# Patient Record
Sex: FEMALE | Race: OTHER
[De-identification: names, ages, dates, MRNs, and addresses within clinical notes are randomized per-mention and may not be internally consistent; named-entity substitution may affect disease eponyms.]

---

## 2020-11-20 ENCOUNTER — HOSPITAL ENCOUNTER (EMERGENCY)
Dept: HOSPITAL 41 - JD.ED | Age: 20
Discharge: HOME | End: 2020-11-20
Payer: SELF-PAY

## 2020-11-20 DIAGNOSIS — L23.9: Primary | ICD-10-CM

## 2020-11-20 NOTE — EDM.PDOC
ED HPI GENERAL MEDICAL PROBLEM





- General


Chief Complaint: Skin Complaint


Stated Complaint: ARM RASH/INFECTION


Time Seen by Provider: 11/20/20 21:01


Source of Information: Reports: Patient, RN Notes Reviewed


History Limitations: Reports: No Limitations





- History of Present Illness


INITIAL COMMENTS - FREE TEXT/NARRATIVE: 





Patient is a 20-year-old female presenting to the emergency department with 

complaints of itchy, scattered rash in various areas around her body over the 

course of the last month.  She states that the itching initially began in her 

left inner arm overlying the area of her birth control implant.  She 

occasionally gets small individual itchy areas scattered throughout her body.  

She states that she lives with her  and daughter.  She often sleeps with 

her daughter and she does not have any of these types of lesions so she does not

think they are bites.  She has not been taking any over-the-counter medications 

for treatment.





- Related Data


                                    Allergies











Allergy/AdvReac Type Severity Reaction Status Date / Time


 


No Known Allergies Allergy   Verified 11/20/20 21:02











Home Meds: 


                                    Home Meds





Etonogestrel [Nexplanon] 68 mg IDERM ASDIRECTED 11/20/20 [History]











Past Medical History





- Past Health History


Medical/Surgical History: Denies Medical/Surgical History





Social & Family History





- Tobacco Use


Tobacco Use Status *Q: Never Tobacco User


Second Hand Smoke Exposure: No





- Caffeine Use


Caffeine Use: Reports: None





- Recreational Drug Use


Recreational Drug Use: No





ED ROS GENERAL





- Review of Systems


Review Of Systems: See Below


Constitutional: Reports: No Symptoms.  Denies: Fever, Chills, Weakness


HEENT: Reports: No Symptoms


Respiratory: Reports: No Symptoms


Cardiovascular: Reports: No Symptoms


Endocrine: Reports: No Symptoms


GI/Abdominal: Reports: No Symptoms


: Reports: No Symptoms


Musculoskeletal: Reports: No Symptoms


Skin: Reports: Rash


Neurological: Reports: No Symptoms


Psychiatric: Reports: No Symptoms


Hematologic/Lymphatic: Reports: No Symptoms


Immunologic: Reports: No Symptoms





ED EXAM, SKIN/RASH


Exam: See Below


General Appearance: Alert, WD/WN, No Apparent Distress


Respiratory/Chest: No Respiratory Distress, Lungs Clear, Normal Breath Sounds, 

No Accessory Muscle Use, Chest Non-Tender


Cardiovascular: Normal Peripheral Pulses, Regular Rate, Rhythm, No Edema, No 

Gallop, No JVD, No Murmur, No Rub


Skin: Rash (Rash to the medial aspect of the left upper arm.  There is an area 

of slight lichenification overlying this area.  Few, very small patches of 

redness scattered throughout the body.)





Course





- Vital Signs


Last Recorded V/S: 





                                Last Vital Signs











Temp  98.0 F   11/20/20 21:00


 


Pulse  79   11/20/20 21:00


 


Resp  17   11/20/20 21:00


 


BP  96/72   11/20/20 21:00


 


Pulse Ox  99   11/20/20 21:00














- Re-Assessments/Exams


Free Text/Narrative Re-Assessment/Exam: 


Patient is a 20-year-old female presenting to the emergency part with complaints

 of a 1 month history of itching and rash which originated to the inner aspect 

of her left upper arm overlying the area of her birth control implant.  She 

states the rash has been present consistently consistently over that area.  She 

does occasionally develop small patches of itchy rash elsewhere on her body as w

ell.  On exam, there is a slightly reddened rash area overlying the birth 

control implant on her left upper arm.  There is an area of lichenification of 

the skin suggestive that there is been chronic irritation and scratching in that

 area for an extended period of time.  She has a few very small patches of 

redness scattered on her body which have the appearance of bites, however she 

states that no one else in her household has these.  I am suspicious that she 

could be having allergic reaction to her birth control implant.  Discussed with 

her that I would recommend that she begin taking an allergy medication such as 

Allegra daily.  I would like her to contact an OB/GYN provider on Monday morning

 to discuss possibly having the implant removed to see if that alleviates her 

symptoms.  Discussed that she can also speak with them about alternative birth 

control options.  She is in agreement with this plan.  Discharge instructions as

 documented.





Departure





- Departure


Time of Disposition: 21:30


Disposition: Home, Self-Care 01


Condition: Good


Clinical Impression: 


 Allergic dermatitis








- Discharge Information


*PRESCRIPTION DRUG MONITORING PROGRAM REVIEWED*: No


*COPY OF PRESCRIPTION DRUG MONITORING REPORT IN PATIENT MAEGAN: No


Instructions:  Rash, Adult


Referrals: 


PCP,None [Primary Care Provider] - 


Additional Instructions: 


You were seen in the emergency department today for a itchy, reddened rash 

particular to the inner aspect of your right upper arm but also scattered in 

small areas throughout your body.  As we discussed, this rash has the appearance

of either an allergy or possibly bug bites.  You stated that nobody else in your

house has these lesions.  Given that the rash is more concentrated in the area 

overlying your birth control implant, it is possible that you are having an 

allergic response to the birth control device.  Recommend that she begin taking 

in over-the-counter antihistamine such as Allegra daily.  This should alleviate 

the itching.  Recommend that you contact one of the OB/GYN providers on Monday 

morning to schedule an appointment to discuss possibly having the implant re

moved and alternative birth control options.  This is the only way to tell if 

this is causing the problem.  You should experience any worsening symptoms, 

please not hesitate to return to the ER.





Sepsis Event Note (ED)





- Evaluation


Sepsis Screening Result: No Definite Risk





- Focused Exam


Vital Signs: 





                                   Vital Signs











  Temp Pulse Resp BP Pulse Ox


 


 11/20/20 21:00  98.0 F  79  17  96/72  99

## 2020-11-23 ENCOUNTER — HOSPITAL ENCOUNTER (EMERGENCY)
Dept: HOSPITAL 41 - JD.ED | Age: 20
Discharge: HOME | End: 2020-11-23
Payer: SELF-PAY

## 2020-11-23 DIAGNOSIS — L23.9: Primary | ICD-10-CM

## 2020-11-23 NOTE — EDM.PDOC
ED HPI GENERAL MEDICAL PROBLEM





- General


Chief Complaint: Skin Complaint


Stated Complaint: RASH AND BLEEDING NOT BETTER


Time Seen by Provider: 11/23/20 14:50


Source of Information: Reports: Patient


History Limitations: Reports: No Limitations





- History of Present Illness


INITIAL COMMENTS - FREE TEXT/NARRATIVE: 





Patient is a 20-year-old female presenting to the emergency department with 

complaints of rash.  She was seen in this emergency department 2 days ago for 

the same complaint.  At that time, she had significant redness and itching over 

her Nexplanon insertion site as well as a few scattered red itchy spots on her 

body.  She was advised to start taking Allegra.  She states that she is taking 

Claritin and that she thinks the spots are worsening.  They continue to itch.  

She does have an appointment tomorrow to have her Nexplanon removed.  Denies any

shortness of breath or chest tightness.


  ** Generalized


Pain Score (Numeric/FACES): 8





- Related Data


                                    Allergies











Allergy/AdvReac Type Severity Reaction Status Date / Time


 


No Known Allergies Allergy   Verified 11/23/20 14:15











Home Meds: 


                                    Home Meds





Etonogestrel [Nexplanon] 68 mg IDERM ASDIRECTED 11/20/20 [History]


predniSONE [Prednisone] 20 mg PO ASDIRECTED #15 tablet 11/23/20 [Rx]











Past Medical History





- Past Health History


Medical/Surgical History: Denies Medical/Surgical History





Social & Family History





- Family History


Family Medical History: No Pertinent Family History





- Tobacco Use


Tobacco Use Status *Q: Never Tobacco User


Second Hand Smoke Exposure: No





- Caffeine Use


Caffeine Use: Reports: None





- Recreational Drug Use


Recreational Drug Use: No





ED ROS GENERAL





- Review of Systems


Review Of Systems: See Below


Constitutional: Reports: No Symptoms


HEENT: Reports: No Symptoms


Respiratory: Reports: No Symptoms


Cardiovascular: Reports: No Symptoms


Endocrine: Reports: No Symptoms


GI/Abdominal: Reports: No Symptoms


: Reports: No Symptoms


Musculoskeletal: Reports: No Symptoms


Skin: Reports: Rash


Neurological: Reports: No Symptoms


Psychiatric: Reports: No Symptoms


Hematologic/Lymphatic: Reports: No Symptoms


Immunologic: Reports: No Symptoms





ED EXAM, SKIN/RASH


Exam: See Below


General Appearance: Alert, WD/WN, No Apparent Distress


Respiratory/Chest: No Respiratory Distress, Lungs Clear, Normal Breath Sounds, 

No Accessory Muscle Use, Chest Non-Tender


Cardiovascular: Normal Peripheral Pulses, Regular Rate, Rhythm, No Edema, No 

Gallop, No JVD, No Murmur, No Rub


Neurological: Alert, Oriented, CN II-XII Intact, Normal Cognition, Normal Gait, 

Normal Reflexes, No Motor/Sensory Deficits


Psychiatric: Normal Affect, Normal Mood


Skin: Other (Area of slight redness and lichenification over the internal aspect

 of her left upper arm near the insertion site of her Nexplanon.  This appears 

to be improved since my previous exam.  Few scattered 1 cm areas of redness and 

pruritus on the body.)





Course





- Vital Signs


Last Recorded V/S: 


                                Last Vital Signs











Temp  98.1 F   11/23/20 14:12


 


Pulse  82   11/23/20 14:12


 


Resp  19   11/23/20 14:12


 


BP  112/79   11/23/20 14:12


 


Pulse Ox  97   11/23/20 14:12














- Re-Assessments/Exams


Free Text/Narrative Re-Assessment/Exam: 


Patient is a 20-year-old female presenting to the emergency department for 

reevaluation with regards to an itchy, generalized rash.  She was seen in this 

ER 2 days ago advised to start taking over-the-counter antihistamines.  She 

states that she is taking Claritin but she feels like it is worsening.  On exam,

 she has about five 1 cm itchy reddened areas scattered throughout her body.  

The rash and redness overlying her Nexplanon site has improved since my previous

 exam.  Overall, I feel that her rash has improved, however she feels it is 

worsened.  She continues to planes of itching.  I will put her on a course of 

prednisone.  Discussed the possibility of other allergens, however I feel that 

the Nexplanon is most likely since the rash was quite localized to that area 

when it initially began.  She should keep her appointment tomorrow to have the 

Nexplanon removed.  Discharge instructions as documented.





Departure





- Departure


Time of Disposition: 15:01


Disposition: Home, Self-Care 01


Condition: Good


Clinical Impression: 


 Allergic dermatitis








- Discharge Information


*PRESCRIPTION DRUG MONITORING PROGRAM REVIEWED*: No


*COPY OF PRESCRIPTION DRUG MONITORING REPORT IN PATIENT MAEGAN: No


Prescriptions: 


predniSONE [Prednisone] 20 mg PO ASDIRECTED #15 tablet


Referrals: 


PCP,None [Primary Care Provider] - 


Forms:  ED Department Discharge


Additional Instructions: 


You were seen in the emergency department for reevaluation of a generalized rash

to your body with itching.  As we discussed, this is likely allergic dermatitis.

 Since the rash began over your Nexplanon site, I feel this may be your 

allergen, however this is not a guarantee.  You have been started on prednisone 

which is a steroid.  This should help with your rash and itching.  Recommend you

keep your appointment as scheduled tomorrow to have the Nexplanon removed.  

Continue to take the over-the-counter antihistamines in addition to the 

prednisone.  Return to ER as needed.





Sepsis Event Note (ED)





- Evaluation


Sepsis Screening Result: No Definite Risk





- Focused Exam


Vital Signs: 


                                   Vital Signs











  Temp Pulse Resp BP Pulse Ox


 


 11/23/20 14:12  98.1 F  82  19  112/79  97

## 2021-03-08 ENCOUNTER — HOSPITAL ENCOUNTER (EMERGENCY)
Dept: HOSPITAL 41 - JD.ED | Age: 21
Discharge: HOME | End: 2021-03-08
Payer: MEDICAID

## 2021-03-08 DIAGNOSIS — U07.1: Primary | ICD-10-CM

## 2021-03-08 LAB — SARS-COV-2 RNA RESP QL NAA+PROBE: POSITIVE

## 2021-03-08 PROCEDURE — 87651 STREP A DNA AMP PROBE: CPT

## 2021-03-08 PROCEDURE — 0240U: CPT

## 2021-03-08 PROCEDURE — 36415 COLL VENOUS BLD VENIPUNCTURE: CPT

## 2021-03-08 PROCEDURE — 80053 COMPREHEN METABOLIC PANEL: CPT

## 2021-03-08 PROCEDURE — 85007 BL SMEAR W/DIFF WBC COUNT: CPT

## 2021-03-08 PROCEDURE — 71046 X-RAY EXAM CHEST 2 VIEWS: CPT

## 2021-03-08 PROCEDURE — 85027 COMPLETE CBC AUTOMATED: CPT

## 2021-03-08 PROCEDURE — 99283 EMERGENCY DEPT VISIT LOW MDM: CPT

## 2021-03-08 PROCEDURE — 86140 C-REACTIVE PROTEIN: CPT

## 2021-03-08 NOTE — EDM.PDOC
ED HPI GENERAL MEDICAL PROBLEM





- General


Chief Complaint: General


Stated Complaint: HEADACHE


Time Seen by Provider: 03/08/21 20:30


Source of Information: Reports: Patient


History Limitations: Reports: No Limitations





- History of Present Illness


INITIAL COMMENTS - FREE TEXT/NARRATIVE: 





Ms. Last is a very pleasant 20-year-old woman with no chronic medical 

problems, who now presents to the ED stating that she has had 3 days of a 

headache, cough, sore throat, chills without fever, generalized body aches, and 

loss of smell, then developed nausea today.  No recent fever.  No dyspnea, 

vomiting, or diarrhea.  She states that she has not taken any over-the-counter 

or home remedies to try to treat any of her symptoms.  No prior similar 

symptoms.





Of note, the patient also brought her daughter to the ED to be evaluated, after 

she developed a fever of 101 degrees this morning.  Her daughter's appetite was 

slightly diminished today, otherwise, her daughter has been completely 

asymptomatic.





Here in the ED, the patient is found to be hemodynamically stable, afebrile, 

saturating 100% on room air.





The patient states that she is on day 6 of 7 with doxycycline, for treatment of 

chlamydia.  Otherwise, prior to Friday, 3/5/2021, the patient denies having a 

recent fever, chills, sore throat, ear pain, nasal or sinus congestion, cough, 

dyspnea, chest pain, palpitations, nausea, vomiting, constipation, diarrhea, 

abdominal pain, urinary symptoms, recent weight gain or weight loss, recent 

bloody bowel movements or black bowel movements, recent joint aches, headaches, 

or rashes.








The patient's PCP is Kelli Garcia NP.


She is not sure if she received an influenza vaccine this season.











- Related Data


                                    Allergies











Allergy/AdvReac Type Severity Reaction Status Date / Time


 


No Known Allergies Allergy   Verified 03/08/21 20:37











Home Meds: 


                                    Home Meds





Doxycycline Monohydrate 100 mg PO BID 03/08/21 [History]











Past Medical History





- Past Health History


Medical/Surgical History: Denies Medical/Surgical History





Social & Family History





- Tobacco Use


Tobacco Use Status *Q: Never Tobacco User


Second Hand Smoke Exposure: No





- Caffeine Use


Caffeine Use: Reports: None





- Alcohol Use


Alcohol Use History: Yes


Alcohol Use Frequency: Socially





- Recreational Drug Use


Recreational Drug Use: Yes


Drug Use in Last 12 Months: Yes


Recreational Drug Type: Reports: Marijuana/Hashish (smokes on occasion, last 

around Jan 2021)





- Living Situation & Occupation


Living situation: Reports: Single, with Significant Other (Boyfriend), with 

Family (Daughter)


Occupation: Employed ([employer])





ED ROS GENERAL





- Review of Systems


Review Of Systems: Comprehensive ROS is negative, except as noted in HPI.





ED EXAM, GENERAL





- Physical Exam


Exam: See Below


Exam Limited By: No Limitations


General Appearance: Alert, No Apparent Distress, Thin


Eye Exam: Bilateral Eye: EOMI, Normal Inspection


Ears: Normal External Exam, Hearing Grossly Normal


Nose: Normal Inspection


Throat/Mouth: Normal Inspection, Normal Lips, Normal Teeth, Normal Gums, Normal 

Oropharynx, Normal Voice, No Airway Compromise


Head: Atraumatic, Normocephalic


Neck: Normal Inspection, Supple, Non-Tender, Full Range of Motion.  No: 

Lymphadenopathy (L), Lymphadenopathy (R)


Respiratory/Chest: No Respiratory Distress, Lungs Clear, Normal Breath Sounds, 

No Accessory Muscle Use.  No: Decreased Breath Sounds, Crackles, Rhonchi, 

Wheezing, Stridor, Prolonged Expiration


Cardiovascular: Normal Peripheral Pulses, Regular Rate, Rhythm, No Edema, No 

Gallop, No JVD, No Murmur, No Rub


Peripheral Pulses: 3+: Radial (L), Radial (R)


GI/Abdominal: Normal Bowel Sounds, Soft, Non-Tender, No Organomegaly, No 

Distention, No Abnormal Bruit, No Mass


Back Exam: Normal Inspection, Full Range of Motion, NT


Extremities: Normal Inspection, Normal Range of Motion, No Pedal Edema, Normal 

Capillary Refill


Neurological: Alert, Oriented, Normal Cognition, No Motor/Sensory Deficits


Psychiatric: Normal Affect


Skin Exam: Warm, Dry, Intact, Normal Color, No Rash





Course





- Vital Signs


Last Recorded V/S: 


                                Last Vital Signs











Temp  36.4 C   03/08/21 20:26


 


Pulse  90   03/08/21 22:50


 


Resp  16   03/08/21 22:50


 


BP  107/78   03/08/21 22:50


 


Pulse Ox  98   03/08/21 22:50














- Orders/Labs/Meds


Labs: 


                                Laboratory Tests











  03/08/21 03/08/21 03/08/21 Range/Units





  21:15 21:24 21:32 


 


WBC    2.85 L  (3.98-10.04)  K/mm3


 


RBC    4.86  (3.98-5.22)  M/mm3


 


Hgb    13.2  (11.2-15.7)  gm/dl


 


Hct    41.5  (34.1-44.9)  %


 


MCV    85.4  (79.4-94.8)  fl


 


MCH    27.2  (25.6-32.2)  pg


 


MCHC    31.8 L  (32.2-35.5)  g/dl


 


RDW Std Deviation    45.3  (36.4-46.3)  fL


 


Plt Count    197  (182-369)  K/mm3


 


MPV    9.8  (9.4-12.3)  fl


 


Neutrophils % (Manual)    26 L  (40-60)  %


 


Band Neutrophils %    2  (0-10)  %


 


Lymphocytes % (Manual)    63 H  (20-40)  %


 


Atypical Lymphs %    0  %


 


Monocytes % (Manual)    9  (2-10)  %


 


Eosinophils % (Manual)    0 L  (0.7-5.8)  %


 


Basophils % (Manual)    0 L  (0.1-1.2)  


 


Platelet Estimate    Adequate  


 


RBC Morph Comment    Normal  


 


Sodium     (136-145)  mEq/L


 


Potassium     (3.5-5.1)  mEq/L


 


Chloride     ()  mEq/L


 


Carbon Dioxide     (21-32)  mEq/L


 


Anion Gap     (5-15)  


 


BUN     (7-18)  mg/dL


 


Creatinine     (0.55-1.02)  mg/dL


 


Est Cr Clr Drug Dosing     mL/min


 


Estimated GFR (MDRD)     (>60)  mL/min


 


BUN/Creatinine Ratio     (14-18)  


 


Glucose     ()  mg/dL


 


Calcium     (8.5-10.1)  mg/dL


 


Total Bilirubin     (0.2-1.0)  mg/dL


 


AST     (15-37)  U/L


 


ALT     (14-59)  U/L


 


Alkaline Phosphatase     ()  U/L


 


C-Reactive Protein     (<1.0)  mg/dL


 


Total Protein     (6.4-8.2)  g/dl


 


Albumin     (3.4-5.0)  g/dl


 


Globulin     gm/dL


 


Albumin/Globulin Ratio     (1-2)  


 


Influenza Type A RNA   Negative   (NEGATIVE)  


 


Influenza Type B RNA   Negative   (NEGATIVE)  


 


SARS-CoV-2 RNA ([facility])   Positive H   (NEGATIVE)  


 


Group A Strep (PCR)  Not detected    (NOT DETECT)  














  03/08/21 Range/Units





  21:32 


 


WBC   (3.98-10.04)  K/mm3


 


RBC   (3.98-5.22)  M/mm3


 


Hgb   (11.2-15.7)  gm/dl


 


Hct   (34.1-44.9)  %


 


MCV   (79.4-94.8)  fl


 


MCH   (25.6-32.2)  pg


 


MCHC   (32.2-35.5)  g/dl


 


RDW Std Deviation   (36.4-46.3)  fL


 


Plt Count   (182-369)  K/mm3


 


MPV   (9.4-12.3)  fl


 


Neutrophils % (Manual)   (40-60)  %


 


Band Neutrophils %   (0-10)  %


 


Lymphocytes % (Manual)   (20-40)  %


 


Atypical Lymphs %   %


 


Monocytes % (Manual)   (2-10)  %


 


Eosinophils % (Manual)   (0.7-5.8)  %


 


Basophils % (Manual)   (0.1-1.2)  


 


Platelet Estimate   


 


RBC Morph Comment   


 


Sodium  142  (136-145)  mEq/L


 


Potassium  3.4 L  (3.5-5.1)  mEq/L


 


Chloride  104  ()  mEq/L


 


Carbon Dioxide  29  (21-32)  mEq/L


 


Anion Gap  12.4  (5-15)  


 


BUN  11  (7-18)  mg/dL


 


Creatinine  0.7  (0.55-1.02)  mg/dL


 


Est Cr Clr Drug Dosing  98.32  mL/min


 


Estimated GFR (MDRD)  > 60  (>60)  mL/min


 


BUN/Creatinine Ratio  15.7  (14-18)  


 


Glucose  105  ()  mg/dL


 


Calcium  8.5  (8.5-10.1)  mg/dL


 


Total Bilirubin  0.2  (0.2-1.0)  mg/dL


 


AST  17  (15-37)  U/L


 


ALT  17  (14-59)  U/L


 


Alkaline Phosphatase  65  ()  U/L


 


C-Reactive Protein  <0.2  (<1.0)  mg/dL


 


Total Protein  7.2  (6.4-8.2)  g/dl


 


Albumin  3.7  (3.4-5.0)  g/dl


 


Globulin  3.5  gm/dL


 


Albumin/Globulin Ratio  1.1  (1-2)  


 


Influenza Type A RNA   (NEGATIVE)  


 


Influenza Type B RNA   (NEGATIVE)  


 


SARS-CoV-2 RNA ([facility])   (NEGATIVE)  


 


Group A Strep (PCR)   (NOT DETECT)  














- Re-Assessments/Exams


Free Text/Narrative Re-Assessment/Exam: 





03/08/21 21:22


As above, the patient reports having a headache, cough, sore throat, chills 

without fever, body aches, and loss of smell since Friday, and nausea today.  

She is afebrile, with an oxygen saturation of 100%, and her physical exam is 

completely unremarkable, but her symptoms are concerning for COVID-19, therefore

I have ordered a work-up that includes several blood tests, a group A strep test

by PCR, a swab for the SARS-CoV-2 virus and influenza virus, and a chest x-ray, 

to evaluate.








03/08/21 22:29


Two-view chest radiograph appears to be grossly normal.  The cardiac silhouette 

is within normal limits.  No pulmonary vascular congestion.  No pleural 

effusions.  No focal infiltrate.  No pneumothorax.  Formal read per the 

Radiologist pending.





The patient's CBC is remarkable for leukopenia of 2.85, but is otherwise 

unremarkable.


Her CMP is remarkable for very slight hypokalemia of 3.4, but is otherwise 

unremarkable.


Her CRP is undetectably low.


Her group A strep test is negative.


Her swab for the SARS-CoV-2 virus has returned positive.


Her swab for the influenza viruses has returned negative.








Because the patient has no risk factors for complications from COVID-19, she 

does not qualify for treatment with monoclonal antibodies.  Because she is not 

hypoxemic, dexamethasone is not indicated.








03/08/21 22:40


Test results discussed with the patient.  I explained that patients with COVID-

19, on average, shed the virus for approximately 10 days, and during that time 

they are infectious.  I recommended that she strictly quarantine for the next 10

days.  That includes her boyfriend, who lives in the same household, as well as 

her daughter, who also tested positive for the SARS-CoV-2 virus.  I recommended 

that they then get tested after 10 days, and not meet with others outside the 

home until they test negative.











Departure





- Departure


Time of Disposition: 22:41


Disposition: Home, Self-Care 01


Condition: Good


Clinical Impression: 


 COVID-19








- Discharge Information


*PRESCRIPTION DRUG MONITORING PROGRAM REVIEWED*: Not Applicable


*COPY OF PRESCRIPTION DRUG MONITORING REPORT IN PATIENT MAEGAN: Not Applicable


Instructions:  COVID-19


Referrals: 


Kelli Garcia NP [Primary Care Provider] - 


Forms:  ED Department Discharge


Additional Instructions: 


You were seen in the emergency room for 3 days of a headache, cough, sore 

throat, chills, body aches, loss of smell, along with nausea, today.





Work-up in the ER included several blood tests, a rapid strep test, swabs for 

the SARS-CoV-2 virus and influenza viruses, and a chest x-ray.





The swab for the SARS-CoV-2 virus returned positive, indicating that you have 

COVID-19.  The remainder of your work-up was entirely unremarkable.





As discussed, people with COVID-19, on average, shed the virus, and are 

therefore infectious, for approximately 10 days.  During this time, you, your 

boyfriend, and daughter will need to strictly quarantine, so as you do not 

spread the virus to others.





After 10 days, we recommend that all 3 of you get retested for COVID-19.  None 

of you should interact with anyone outside of the home until you all test 

negative.





If any problems develop, please follow-up with your PCP, Kelli Garcia NP, 

or return to the for reevaluation.





Sepsis Event Note (ED)





- Evaluation


Sepsis Screening Result: No Definite Risk

## 2021-03-09 NOTE — CR
Chest: 2 views of the chest were obtained.

 

Comparison: No previous chest imaging is available.

 

Heart size and mediastinum are normal.  Lungs are clear with no acute 

parenchymal change.  Bony structures are unremarkable.

 

Impression:

1.  Nothing acute is seen on 2 view chest x-ray.

 

Diagnostic code #1